# Patient Record
Sex: MALE | Race: WHITE | NOT HISPANIC OR LATINO | Employment: UNEMPLOYED | URBAN - NONMETROPOLITAN AREA
[De-identification: names, ages, dates, MRNs, and addresses within clinical notes are randomized per-mention and may not be internally consistent; named-entity substitution may affect disease eponyms.]

---

## 2021-05-05 ENCOUNTER — APPOINTMENT (OUTPATIENT)
Dept: OCCUPATIONAL MEDICINE | Facility: OTHER | Age: 46
End: 2021-05-05

## 2021-05-10 ENCOUNTER — APPOINTMENT (OUTPATIENT)
Dept: OCCUPATIONAL MEDICINE | Facility: OTHER | Age: 46
End: 2021-05-10

## 2022-04-28 ENCOUNTER — LAB (OUTPATIENT)
Dept: OCCUPATIONAL MEDICINE | Facility: OTHER | Age: 47
End: 2022-04-28

## 2022-11-15 ENCOUNTER — APPOINTMENT (OUTPATIENT)
Dept: OCCUPATIONAL MEDICINE | Facility: OTHER | Age: 47
End: 2022-11-15

## 2025-05-01 ENCOUNTER — HOSPITAL ENCOUNTER (EMERGENCY)
Facility: HOSPITAL | Age: 50
Discharge: HOME | End: 2025-05-01
Attending: PHYSICIAN ASSISTANT
Payer: COMMERCIAL

## 2025-05-01 ENCOUNTER — APPOINTMENT (OUTPATIENT)
Dept: CT IMAGING | Facility: HOSPITAL | Age: 50
End: 2025-05-01
Attending: PHYSICIAN ASSISTANT
Payer: COMMERCIAL

## 2025-05-01 VITALS
DIASTOLIC BLOOD PRESSURE: 99 MMHG | RESPIRATION RATE: 18 BRPM | OXYGEN SATURATION: 96 % | TEMPERATURE: 97.5 F | HEART RATE: 85 BPM | SYSTOLIC BLOOD PRESSURE: 157 MMHG

## 2025-05-01 DIAGNOSIS — R00.2 PALPITATIONS: ICD-10-CM

## 2025-05-01 DIAGNOSIS — R07.9 CHEST PAIN OF UNKNOWN ETIOLOGY: Primary | ICD-10-CM

## 2025-05-01 LAB
ANION GAP SERPL CALCULATED.3IONS-SCNC: 21 MMOL/L (ref 7–15)
ATRIAL RATE - MUSE: 97 BPM
BASOPHILS # BLD AUTO: 0 10E3/UL (ref 0–0.2)
BASOPHILS NFR BLD AUTO: 1 %
BUN SERPL-MCNC: 11.5 MG/DL (ref 6–20)
CALCIUM SERPL-MCNC: 10.1 MG/DL (ref 8.8–10.4)
CHLORIDE SERPL-SCNC: 99 MMOL/L (ref 98–107)
CREAT SERPL-MCNC: 1.04 MG/DL (ref 0.67–1.17)
D DIMER PPP FEU-MCNC: <0.27 UG/ML FEU (ref 0–0.5)
DIASTOLIC BLOOD PRESSURE - MUSE: NORMAL MMHG
EGFRCR SERPLBLD CKD-EPI 2021: 87 ML/MIN/1.73M2
EOSINOPHIL # BLD AUTO: 0.1 10E3/UL (ref 0–0.7)
EOSINOPHIL NFR BLD AUTO: 1 %
ERYTHROCYTE [DISTWIDTH] IN BLOOD BY AUTOMATED COUNT: 12.2 % (ref 10–15)
GLUCOSE SERPL-MCNC: 121 MG/DL (ref 70–99)
HCO3 SERPL-SCNC: 19 MMOL/L (ref 22–29)
HCT VFR BLD AUTO: 51.5 % (ref 40–53)
HGB BLD-MCNC: 17.6 G/DL (ref 13.3–17.7)
IMM GRANULOCYTES # BLD: 0 10E3/UL
IMM GRANULOCYTES NFR BLD: 0 %
INTERPRETATION ECG - MUSE: NORMAL
LYMPHOCYTES # BLD AUTO: 2.1 10E3/UL (ref 0.8–5.3)
LYMPHOCYTES NFR BLD AUTO: 28 %
MAGNESIUM SERPL-MCNC: 2.2 MG/DL (ref 1.7–2.3)
MCH RBC QN AUTO: 29.3 PG (ref 26.5–33)
MCHC RBC AUTO-ENTMCNC: 34.2 G/DL (ref 31.5–36.5)
MCV RBC AUTO: 86 FL (ref 78–100)
MONOCYTES # BLD AUTO: 0.7 10E3/UL (ref 0–1.3)
MONOCYTES NFR BLD AUTO: 9 %
NEUTROPHILS # BLD AUTO: 4.7 10E3/UL (ref 1.6–8.3)
NEUTROPHILS NFR BLD AUTO: 62 %
NRBC # BLD AUTO: 0 10E3/UL
NRBC BLD AUTO-RTO: 0 /100
P AXIS - MUSE: 23 DEGREES
PLATELET # BLD AUTO: 272 10E3/UL (ref 150–450)
POTASSIUM SERPL-SCNC: 3.5 MMOL/L (ref 3.4–5.3)
PR INTERVAL - MUSE: 132 MS
QRS DURATION - MUSE: 84 MS
QT - MUSE: 348 MS
QTC - MUSE: 441 MS
R AXIS - MUSE: -3 DEGREES
RBC # BLD AUTO: 6 10E6/UL (ref 4.4–5.9)
SODIUM SERPL-SCNC: 139 MMOL/L (ref 135–145)
SYSTOLIC BLOOD PRESSURE - MUSE: NORMAL MMHG
T AXIS - MUSE: 92 DEGREES
TROPONIN T SERPL HS-MCNC: <6 NG/L
VENTRICULAR RATE- MUSE: 97 BPM
WBC # BLD AUTO: 7.6 10E3/UL (ref 4–11)

## 2025-05-01 PROCEDURE — 250N000013 HC RX MED GY IP 250 OP 250 PS 637: Performed by: PHYSICIAN ASSISTANT

## 2025-05-01 PROCEDURE — 80048 BASIC METABOLIC PNL TOTAL CA: CPT | Performed by: PHYSICIAN ASSISTANT

## 2025-05-01 PROCEDURE — 85379 FIBRIN DEGRADATION QUANT: CPT | Performed by: PHYSICIAN ASSISTANT

## 2025-05-01 PROCEDURE — 93005 ELECTROCARDIOGRAM TRACING: CPT

## 2025-05-01 PROCEDURE — 83735 ASSAY OF MAGNESIUM: CPT | Performed by: PHYSICIAN ASSISTANT

## 2025-05-01 PROCEDURE — 85025 COMPLETE CBC W/AUTO DIFF WBC: CPT | Performed by: PHYSICIAN ASSISTANT

## 2025-05-01 PROCEDURE — 250N000011 HC RX IP 250 OP 636: Performed by: PHYSICIAN ASSISTANT

## 2025-05-01 PROCEDURE — 71275 CT ANGIOGRAPHY CHEST: CPT | Mod: 26 | Performed by: RADIOLOGY

## 2025-05-01 PROCEDURE — 250N000011 HC RX IP 250 OP 636: Performed by: RADIOLOGY

## 2025-05-01 PROCEDURE — 36415 COLL VENOUS BLD VENIPUNCTURE: CPT | Performed by: PHYSICIAN ASSISTANT

## 2025-05-01 PROCEDURE — 96374 THER/PROPH/DIAG INJ IV PUSH: CPT | Mod: XU

## 2025-05-01 PROCEDURE — 84484 ASSAY OF TROPONIN QUANT: CPT | Performed by: PHYSICIAN ASSISTANT

## 2025-05-01 PROCEDURE — 99284 EMERGENCY DEPT VISIT MOD MDM: CPT | Performed by: PHYSICIAN ASSISTANT

## 2025-05-01 PROCEDURE — 99285 EMERGENCY DEPT VISIT HI MDM: CPT | Mod: 25

## 2025-05-01 PROCEDURE — 93010 ELECTROCARDIOGRAM REPORT: CPT | Performed by: INTERNAL MEDICINE

## 2025-05-01 PROCEDURE — 71275 CT ANGIOGRAPHY CHEST: CPT

## 2025-05-01 RX ORDER — LORAZEPAM 2 MG/ML
0.5 INJECTION INTRAMUSCULAR ONCE
Status: COMPLETED | OUTPATIENT
Start: 2025-05-01 | End: 2025-05-01

## 2025-05-01 RX ORDER — ASPIRIN 81 MG/1
324 TABLET, CHEWABLE ORAL ONCE
Status: COMPLETED | OUTPATIENT
Start: 2025-05-01 | End: 2025-05-01

## 2025-05-01 RX ORDER — METOPROLOL TARTRATE 50 MG
50 TABLET ORAL PRN
Qty: 2 TABLET | Refills: 0 | Status: SHIPPED | OUTPATIENT
Start: 2025-05-01

## 2025-05-01 RX ORDER — IOPAMIDOL 755 MG/ML
100 INJECTION, SOLUTION INTRAVASCULAR ONCE
Status: COMPLETED | OUTPATIENT
Start: 2025-05-01 | End: 2025-05-01

## 2025-05-01 RX ADMIN — IOPAMIDOL 100 ML: 755 INJECTION, SOLUTION INTRAVENOUS at 15:24

## 2025-05-01 RX ADMIN — ASPIRIN 324 MG: 81 TABLET, CHEWABLE ORAL at 14:23

## 2025-05-01 RX ADMIN — LORAZEPAM 0.5 MG: 2 INJECTION INTRAMUSCULAR; INTRAVENOUS at 14:24

## 2025-05-01 ASSESSMENT — ENCOUNTER SYMPTOMS
GASTROINTESTINAL NEGATIVE: 1
DIAPHORESIS: 0
NERVOUS/ANXIOUS: 1
FATIGUE: 0
FEVER: 0

## 2025-05-01 ASSESSMENT — COLUMBIA-SUICIDE SEVERITY RATING SCALE - C-SSRS
2. HAVE YOU ACTUALLY HAD ANY THOUGHTS OF KILLING YOURSELF IN THE PAST MONTH?: NO
1. IN THE PAST MONTH, HAVE YOU WISHED YOU WERE DEAD OR WISHED YOU COULD GO TO SLEEP AND NOT WAKE UP?: NO
6. HAVE YOU EVER DONE ANYTHING, STARTED TO DO ANYTHING, OR PREPARED TO DO ANYTHING TO END YOUR LIFE?: NO

## 2025-05-01 ASSESSMENT — ACTIVITIES OF DAILY LIVING (ADL)
ADLS_ACUITY_SCORE: 41
ADLS_ACUITY_SCORE: 41

## 2025-05-01 NOTE — DISCHARGE INSTRUCTIONS
I have ordered multiple outpatient studies including a CT angiogram of your heart arteries and a home Zio patch which will monitor your heart rate for the next 2 weeks.  Follow-up with your primary care physician tomorrow as scheduled.  They will discuss surveillance of your mild dilation of your ascending aorta.  Rest and stay hydrated.  Return to this emergency department for any new or worsening symptoms or other questions or concerns.

## 2025-05-01 NOTE — ED TRIAGE NOTES
Pt presents that a few weeks ago saw PCP and had some heart palpations, EKG was done.  Recenlty having cardiac symptoms and having decreased HR.  Has left shoulder issues.  Today while going ot the bathroom and was bearing down and had some twinges in the left arm and HR decreased.  At [employer] the other day had a few twinges in the left shoulder/chest area, not sure if its cardiac  or shoulder.   Has folllow up with PCP tomorrow. Pt does have ativan prescribed to him recently for his anxiety but doesn't take it much at all        
22-Jun-2019 04:53

## 2025-05-01 NOTE — ED PROVIDER NOTES
History     Chief Complaint   Patient presents with    Chest Pain    Palpitations    Bradycardia           Anxiety     The history is provided by the patient.     Tolu Hope is a 49 year old male who presented to the emergency department ambulatory for evaluation of palpitations, anxiety, episodes of bradycardia, as well as intermittent left arm pain.  Ongoing for quite some time.  No chest pain or pressure.  Scheduled to see his primary care physician tomorrow.  No significant past medical history.  He takes no medications other than intermittent Ativan.  No cough or shortness of breath.  No diaphoresis or vomiting.  He has intermittent twinges of left arm pain.  No syncopal episode.  No substernal chest pain or epigastric pain.  No back pain.    Allergies:  No Known Allergies    Problem List:    There are no active problems to display for this patient.       Past Medical History:    No past medical history on file.    Past Surgical History:    No past surgical history on file.    Family History:    No family history on file.    Social History:  Marital Status:  Single [1]        Medications:    metoprolol tartrate (LOPRESSOR) 50 MG tablet          Review of Systems   Constitutional:  Negative for diaphoresis, fatigue and fever.   Cardiovascular:         See HPI   Gastrointestinal: Negative.    Psychiatric/Behavioral:  The patient is nervous/anxious.        Physical Exam   BP: (!) 162/116  Pulse: 105  Temp: 97.5  F (36.4  C)  Resp: 16  SpO2: 96 %      Physical Exam  Vitals and nursing note reviewed.   Constitutional:       General: He is not in acute distress.     Appearance: Normal appearance. He is normal weight. He is not ill-appearing, toxic-appearing or diaphoretic.      Comments: Pleasant and talkative well-appearing 49-year-old male found semireclined in no distress   Cardiovascular:      Rate and Rhythm: Normal rate and regular rhythm.   Pulmonary:      Effort: Pulmonary effort is normal.      Breath  sounds: Normal breath sounds.   Skin:     General: Skin is warm and dry.      Capillary Refill: Capillary refill takes less than 2 seconds.   Neurological:      General: No focal deficit present.      Mental Status: He is alert and oriented to person, place, and time.         ED Course     ED Course as of 05/01/25 1618   Thu May 01, 2025   1411 My independent review of the EKG shows sinus rhythm with a marked sinus arrhythmia at a rate of 97.  Normal CT interval.  Normal QRS duration.  Normal QTc normal axis.  Nonspecific T wave abnormalities noted mostly in V2 and V3.  No concerning ST elevation.  No previous EKG for comparison.   1413 Differential diagnoses considered but not limited to ACS, aortic dissection, pulmonary embolism, pneumothorax, pneumonia, esophageal injury, pericardial effusion, pericarditis, myocarditis, musculoskeletal source, cervical angina, and mediastinitis.    1418 D-Dimer Quantitative: <0.27   1426 Troponin T, High Sensitivity: <6   1435 My independent review of the chest x-ray shows no evidence of focal consolidation, pneumothorax, or widened mediastinum.     Procedures              Critical Care time:  none     None         Results for orders placed or performed during the hospital encounter of 05/01/25 (from the past 24 hours)   CBC with platelets differential    Narrative    The following orders were created for panel order CBC with platelets differential.  Procedure                               Abnormality         Status                     ---------                               -----------         ------                     CBC with platelets and ...[[id number]]  Abnormal            Final result                 Please view results for these tests on the individual orders.   D dimer quantitative   Result Value Ref Range    D-Dimer Quantitative <0.27 0.00 - 0.50 ug/mL FEU    Narrative    This D-dimer assay is intended for use in conjunction with a clinical pretest probability  assessment model to exclude pulmonary embolism (PE) and deep venous thrombosis (DVT) in outpatients suspected of PE or DVT. The cut-off value is 0.50 ug/mL FEU.    For patients 50 years of age or older, the application of age-adjusted cut-off values for D-Dimer may increase the specificity without significant effect on sensitivity. The literature suggested calculation age adjusted cut-off in ug/L = age in years x 10 ug/L. The results in this laboratory are reported as ug/mL rather than ug/L. The calculation for age adjusted cut off in ug/mL= age in years x 0.01 ug/mL. For example, the cut off for a 76 year old male is 76 x 0.01 ug/mL = 0.76 ug/mL (760 ug/L).    M Maricarmen et al. Age adjusted D-dimer cut-off levels to rule out pulmonary embolism: The ADJUST-PE Study. CARMINE 2014;311:3605-4812.; HJ Leobardo et al. Diagnostic accuracy of conventional or age adjusted D-dimer cutoff values in older patients with suspected venous thromboembolism. Systemic review and meta-analysis. BMJ 2013:346:f2492.   Basic metabolic panel   Result Value Ref Range    Sodium 139 135 - 145 mmol/L    Potassium 3.5 3.4 - 5.3 mmol/L    Chloride 99 98 - 107 mmol/L    Carbon Dioxide (CO2)      Anion Gap      Urea Nitrogen 11.5 6.0 - 20.0 mg/dL    Creatinine 1.04 0.67 - 1.17 mg/dL    GFR Estimate 87 >60 mL/min/1.73m2    Calcium 10.1 8.8 - 10.4 mg/dL    Glucose 121 (H) 70 - 99 mg/dL   Troponin T, High Sensitivity   Result Value Ref Range    Troponin T, High Sensitivity <6 <=22 ng/L   Magnesium   Result Value Ref Range    Magnesium 2.2 1.7 - 2.3 mg/dL   CBC with platelets and differential   Result Value Ref Range    WBC Count 7.6 4.0 - 11.0 10e3/uL    RBC Count 6.00 (H) 4.40 - 5.90 10e6/uL    Hemoglobin 17.6 13.3 - 17.7 g/dL    Hematocrit 51.5 40.0 - 53.0 %    MCV 86 78 - 100 fL    MCH 29.3 26.5 - 33.0 pg    MCHC 34.2 31.5 - 36.5 g/dL    RDW 12.2 10.0 - 15.0 %    Platelet Count 272 150 - 450 10e3/uL    % Neutrophils 62 %    % Lymphocytes 28 %    %  Monocytes 9 %    % Eosinophils 1 %    % Basophils 1 %    % Immature Granulocytes 0 %    NRBCs per 100 WBC 0 <1 /100    Absolute Neutrophils 4.7 1.6 - 8.3 10e3/uL    Absolute Lymphocytes 2.1 0.8 - 5.3 10e3/uL    Absolute Monocytes 0.7 0.0 - 1.3 10e3/uL    Absolute Eosinophils 0.1 0.0 - 0.7 10e3/uL    Absolute Basophils 0.0 0.0 - 0.2 10e3/uL    Absolute Immature Granulocytes 0.0 <=0.4 10e3/uL    Absolute NRBCs 0.0 10e3/uL   EKG 12-lead, tracing only   Result Value Ref Range    Systolic Blood Pressure  mmHg    Diastolic Blood Pressure  mmHg    Ventricular Rate 97 BPM    Atrial Rate 97 BPM    RI Interval 132 ms    QRS Duration 84 ms     ms    QTc 441 ms    P Axis 23 degrees    R AXIS -3 degrees    T Axis 92 degrees    Interpretation ECG       Sinus rhythm with marked sinus arrhythmia  Nonspecific T wave abnormality  Abnormal ECG  No previous ECGs available     XR Chest 2 Views    Narrative    PROCEDURE:  XR CHEST 2 VIEWS    HISTORY: Chest pain, .    COMPARISON:  None.    FINDINGS:  The cardiomediastinal contours are normal. The trachea is midline.  No focal consolidation, effusion or pneumothorax.    No suspicious osseous lesion or subdiaphragmatic free air.      Impression    IMPRESSION:      No acute cardiopulmonary process.      DENISSE TIRADO MD         SYSTEM ID:  [id number]   CTA Chest with Contrast    Narrative    PROCEDURE: CTA CHEST WITH CONTRAST 5/1/2025 3:33 PM    HISTORY: Chest pain.  Aortic study    COMPARISONS: None.    Meds/Dose Given: ISOVUE 370 100ML    TECHNIQUE: CT angiogram of the chest with sagittal and coronal map  reconstructions    FINDINGS: The heart is normal in size. There is mild dilation of the  ascending aorta measuring 4.1 cm in diameter. There is no evidence of  aortic dissection. The proximal great vessels appear normal. The  celiac superior mesenteric and renal arteries appear normal.    There are no pulmonary emboli in the major pulmonary arteries.    The lungs are clear. The  "hilar and mediastinal lymph nodes are normal  in caliber. Axillary and supraclavicular lymph nodes appear normal.  The upper portion of the liver demonstrates fatty infiltration. There  are no liver masses or biliary ductal enlargement. The upper portion  of the spleen and pancreas are normal. The adrenal glands are normal.  The regional skeleton is intact.         Impression    IMPRESSION: Mildly dilated ascending aorta measuring 4.1 cm in  diameter. There is no evidence of aortic dissection.    GIO HITCHCOCK MD         SYSTEM ID:  [id number]       Medications   aspirin (ASA) chewable tablet 324 mg (324 mg Oral $Given 5/1/25 4793)   LORazepam (ATIVAN) injection 0.5 mg (0.5 mg Intravenous $Given 5/1/25 1424)   iopamidol (ISOVUE-370) solution 100 mL (100 mLs Intravenous $Given 5/1/25 7684)   sodium chloride (PF) 0.9% PF flush 100 mL (100 mLs Intravenous $Given 5/1/25 9534)       Assessments & Plan (with Medical Decision Making)   This is a 49-year-old male who presented to the emergency department for evaluation of episodic bradycardia as well as anxiety and palpitations.  The patient reports intermittent \"twinges\" of left arm discomfort including when bearing down.  He denies any chest pains or chest pressure.  Denies arm pain now.  He denies any syncopal episode.  He takes no medications other than Ativan.  He is on no blood thinners.  He has no significant past medical history.  Broad differential was considered to include any number of intrathoracic catastrophes as well as electrolyte abnormalities.  He states he has a known \"abnormal EKG.\"  There are some nonspecific T wave abnormalities of unknown clinical significance.  His troponin is undetectable.  His D-dimer is normal.  Cross-sectional imaging of the chest as above.  I do not believe he is having any symptoms from his mild dilation of his ascending aorta.  There is no evidence of dissection.  He has a follow-up appointment tomorrow with his primary care " physician.  I did order an outpatient Zio patch as well as CT coronary artery scan.  His primary care is in [place].  Very strict return precautions were provided.  Tolu and his wife had no further questions or concerns for me.    Mr. Hope has also agreed to see his primary provider tomorrow as scheduled for re-evaluation and further management. He verbalized an understanding of the results of our workup and agrees with the complete discharge plan including instructions for return and follow up.  There is no indication for further investigation or treatment on an emergent basis.  There is no reasonably foreseeable injury that would be associated with discharge and close outpatient follow-up.        This document was prepared using a combination of typing and voice generated software.  While every attempt was made for accuracy, spelling and grammatical errors may exist.     I have reviewed the nursing notes.    I have reviewed the findings, diagnosis, plan and need for follow up with the patient.           Medical Decision Making  The patient's presentation was of moderate complexity (an undiagnosed new problem with uncertain prognosis).    The patient's evaluation involved:  ordering and/or review of 3+ test(s) in this encounter (labs, CTs, EKG)    The patient's management necessitated moderate risk (prescription drug management including medications given in the ED), moderate risk (IV contrast administration), and high risk (a parenteral controlled substance).        Discharge Medication List as of 5/1/2025  3:50 PM        START taking these medications    Details   metoprolol tartrate (LOPRESSOR) 50 MG tablet Take 1 tablet (50 mg) by mouth as needed., Disp-2 tablet, R-0, E-Prescribe             Final diagnoses:   Palpitations   Chest pain of unknown etiology       5/1/2025   [facility]       Ayaz Ramirez PA-C  05/01/25 1611       Ayaz Ramirez PA-C  05/01/25 1616

## 2025-05-02 LAB
ATRIAL RATE - MUSE: 97 BPM
DIASTOLIC BLOOD PRESSURE - MUSE: NORMAL MMHG
INTERPRETATION ECG - MUSE: NORMAL
P AXIS - MUSE: 23 DEGREES
PR INTERVAL - MUSE: 132 MS
QRS DURATION - MUSE: 84 MS
QT - MUSE: 348 MS
QTC - MUSE: 441 MS
R AXIS - MUSE: -3 DEGREES
SYSTOLIC BLOOD PRESSURE - MUSE: NORMAL MMHG
T AXIS - MUSE: 92 DEGREES
VENTRICULAR RATE- MUSE: 97 BPM

## 2025-05-06 ENCOUNTER — TELEPHONE (OUTPATIENT)
Dept: INTERVENTIONAL RADIOLOGY/VASCULAR | Facility: HOSPITAL | Age: 50
End: 2025-05-06

## 2025-05-06 NOTE — PROVIDER NOTIFICATION
Attempted to call pt to schedule pt for CTA angiogram. Unable to leave a message or call back number.

## 2025-05-23 RX ORDER — METOPROLOL TARTRATE 1 MG/ML
5-15 INJECTION, SOLUTION INTRAVENOUS
Status: CANCELLED | OUTPATIENT
Start: 2025-05-23

## 2025-05-23 RX ORDER — LIDOCAINE 40 MG/G
CREAM TOPICAL
Status: CANCELLED | OUTPATIENT
Start: 2025-05-23

## 2025-05-23 RX ORDER — METOPROLOL TARTRATE 25 MG/1
25-100 TABLET, FILM COATED ORAL
Status: CANCELLED | OUTPATIENT
Start: 2025-05-23

## 2025-05-23 RX ORDER — NITROGLYCERIN 0.4 MG/1
0.4 TABLET SUBLINGUAL
Status: CANCELLED | OUTPATIENT
Start: 2025-05-23

## 2025-05-23 RX ORDER — ONDANSETRON 2 MG/ML
4 INJECTION INTRAMUSCULAR; INTRAVENOUS
Status: CANCELLED | OUTPATIENT
Start: 2025-05-23

## 2025-05-27 ENCOUNTER — RESULTS FOLLOW-UP (OUTPATIENT)
Dept: EMERGENCY MEDICINE | Facility: HOSPITAL | Age: 50
End: 2025-05-27

## 2025-05-27 ENCOUNTER — HOSPITAL ENCOUNTER (OUTPATIENT)
Dept: CT IMAGING | Facility: HOSPITAL | Age: 50
Discharge: HOME | End: 2025-05-27
Attending: PHYSICIAN ASSISTANT
Payer: COMMERCIAL

## 2025-05-27 ENCOUNTER — HOSPITAL ENCOUNTER (OUTPATIENT)
Facility: HOSPITAL | Age: 50
Discharge: HOME | End: 2025-05-27
Attending: RADIOLOGY | Admitting: RADIOLOGY
Payer: COMMERCIAL

## 2025-05-27 VITALS
OXYGEN SATURATION: 95 % | SYSTOLIC BLOOD PRESSURE: 118 MMHG | RESPIRATION RATE: 16 BRPM | DIASTOLIC BLOOD PRESSURE: 72 MMHG | HEART RATE: 73 BPM

## 2025-05-27 DIAGNOSIS — R07.9 CHEST PAIN OF UNKNOWN ETIOLOGY: ICD-10-CM

## 2025-05-27 PROCEDURE — 75574 CT ANGIO HRT W/3D IMAGE: CPT

## 2025-05-27 PROCEDURE — 250N000011 HC RX IP 250 OP 636: Performed by: RADIOLOGY

## 2025-05-27 PROCEDURE — 75574 CT ANGIO HRT W/3D IMAGE: CPT | Mod: 26 | Performed by: RADIOLOGY

## 2025-05-27 PROCEDURE — 250N000013 HC RX MED GY IP 250 OP 250 PS 637: Performed by: RADIOLOGY

## 2025-05-27 RX ORDER — IOPAMIDOL 755 MG/ML
75 INJECTION, SOLUTION INTRAVASCULAR ONCE
Status: COMPLETED | OUTPATIENT
Start: 2025-05-27 | End: 2025-05-27

## 2025-05-27 RX ORDER — METOPROLOL TARTRATE 1 MG/ML
5-15 INJECTION, SOLUTION INTRAVENOUS
Status: DISCONTINUED | OUTPATIENT
Start: 2025-05-27 | End: 2025-05-28 | Stop reason: HOSPADM

## 2025-05-27 RX ORDER — NITROGLYCERIN 0.4 MG/1
0.4 TABLET SUBLINGUAL
Status: DISCONTINUED | OUTPATIENT
Start: 2025-05-27 | End: 2025-05-28 | Stop reason: HOSPADM

## 2025-05-27 RX ORDER — LIDOCAINE 40 MG/G
CREAM TOPICAL
Status: DISCONTINUED | OUTPATIENT
Start: 2025-05-27 | End: 2025-05-28 | Stop reason: HOSPADM

## 2025-05-27 RX ORDER — METOPROLOL TARTRATE 25 MG/1
25-100 TABLET, FILM COATED ORAL
Status: DISCONTINUED | OUTPATIENT
Start: 2025-05-27 | End: 2025-05-28 | Stop reason: HOSPADM

## 2025-05-27 RX ORDER — ONDANSETRON 2 MG/ML
4 INJECTION INTRAMUSCULAR; INTRAVENOUS
Status: DISCONTINUED | OUTPATIENT
Start: 2025-05-27 | End: 2025-05-28 | Stop reason: HOSPADM

## 2025-05-27 RX ADMIN — IOPAMIDOL 75 ML: 755 INJECTION, SOLUTION INTRAVENOUS at 08:58

## 2025-05-27 RX ADMIN — NITROGLYCERIN 0.4 MG: 0.4 TABLET SUBLINGUAL at 08:47

## 2025-05-27 ASSESSMENT — ACTIVITIES OF DAILY LIVING (ADL)
ADLS_ACUITY_SCORE: 41
ADLS_ACUITY_SCORE: 41

## 2025-05-27 NOTE — PROGRESS NOTES
Procedure: CTA    # 18 IV started in Right AC. Vital signs stable. Heart rhythm NSR and once went irregular.      Position:  supine    Pain:  0    Nitroglycerin 0.4 mg SL given for procedure given     Tolerated procedure well.     Condition: Stable    Comments: No questions or concerns      Discharged to home, ambulated to vehicle without incident .    MONA SEPULVEDA RN

## 2025-05-27 NOTE — PROGRESS NOTES
Pt ambulated to [facility] 16 without incident.  Pt reports to follow the pre procedure instructions.  Pt was prescribed Metoprolol for the procedure but didn't take it per his provider recommendations as he was having near syncopal episodes and had HR down in the 30's.  Reviewed about the procedure and pt stated if he needs to have metoprolol to lower his HR he refuses it.  Ok with taking the nitroglycerin.  At this time pt has no chest pain or syncopal feeling.  While on monitors pt was in NSR and then at time was irregular, per pt he could feel it, no PVC or PAC noted. No chest pains but just felt when he changed rhythms.

## 2025-05-29 ENCOUNTER — MEDICAL CORRESPONDENCE (OUTPATIENT)
Dept: HEALTH INFORMATION MANAGEMENT | Facility: HOSPITAL | Age: 50
End: 2025-05-29

## 2025-05-29 DIAGNOSIS — R00.2 PALPITATIONS: Primary | ICD-10-CM
